# Patient Record
Sex: FEMALE | Race: BLACK OR AFRICAN AMERICAN | NOT HISPANIC OR LATINO | URBAN - METROPOLITAN AREA
[De-identification: names, ages, dates, MRNs, and addresses within clinical notes are randomized per-mention and may not be internally consistent; named-entity substitution may affect disease eponyms.]

---

## 2024-01-11 ENCOUNTER — OFFICE VISIT (OUTPATIENT)
Dept: OPHTHALMOLOGY | Facility: CLINIC | Age: 29
End: 2024-01-11
Payer: COMMERCIAL

## 2024-01-11 DIAGNOSIS — H52.13 MYOPIA OF BOTH EYES WITH ASTIGMATISM: ICD-10-CM

## 2024-01-11 DIAGNOSIS — H53.023 REFRACTIVE AMBLYOPIA OF BOTH EYES: ICD-10-CM

## 2024-01-11 DIAGNOSIS — H52.203 MYOPIA OF BOTH EYES WITH ASTIGMATISM: ICD-10-CM

## 2024-01-11 DIAGNOSIS — H40.003 GLAUCOMA SUSPECT OF BOTH EYES: Primary | ICD-10-CM

## 2024-01-11 PROCEDURE — 92133 CPTRZD OPH DX IMG PST SGM ON: CPT | Performed by: OPTOMETRIST

## 2024-01-11 PROCEDURE — 76514 ECHO EXAM OF EYE THICKNESS: CPT | Performed by: OPTOMETRIST

## 2024-01-11 PROCEDURE — 92015 DETERMINE REFRACTIVE STATE: CPT | Performed by: OPTOMETRIST

## 2024-01-11 PROCEDURE — 92083 EXTENDED VISUAL FIELD XM: CPT | Performed by: OPTOMETRIST

## 2024-01-11 PROCEDURE — 92004 COMPRE OPH EXAM NEW PT 1/>: CPT | Performed by: OPTOMETRIST

## 2024-01-11 ASSESSMENT — PACHYMETRY
OD_CT(UM): 495
OS_CT(UM): 484

## 2024-01-11 ASSESSMENT — REFRACTION_MANIFEST
OD_CYLINDER: -2.50
OD_SPHERE: -2.25
OD_SPHERE: -2.50
OS_AXIS: 180
OS_SPHERE: -2.00
OD_CYLINDER: -2.50
OD_AXIS: 180
OS_CYLINDER: -3.00
OS_AXIS: 179
METHOD_AUTOREFRACTION: 1
OD_AXIS: 180
OS_CYLINDER: -3.00
OS_SPHERE: -2.50

## 2024-01-11 ASSESSMENT — VISUAL ACUITY
OS_PH_SC: 20/30
OS_SC: 20/60
OD_SC: 20/80
OD_PH_SC: 20/30
METHOD: SNELLEN - LINEAR

## 2024-01-11 ASSESSMENT — SLIT LAMP EXAM - LIDS
COMMENTS: GOOD POSITION
COMMENTS: GOOD POSITION

## 2024-01-11 ASSESSMENT — TONOMETRY
IOP_METHOD: GOLDMANN APPLANATION
OD_IOP_MMHG: 18
OS_IOP_MMHG: 18

## 2024-01-11 ASSESSMENT — CUP TO DISC RATIO
OD_RATIO: .6
OS_RATIO: .6

## 2024-01-11 ASSESSMENT — EXTERNAL EXAM - LEFT EYE: OS_EXAM: NORMAL

## 2024-01-11 ASSESSMENT — EXTERNAL EXAM - RIGHT EYE: OD_EXAM: NORMAL

## 2024-01-11 NOTE — PROGRESS NOTES
Glaucoma suspect. No fam hx GLC. Optic nerve cupping and rim follows ISNT rule and   IOP 18 OU, pachy adjust +4 OU.   Optical coherence tomography of the retinal nerve fiber layer (RNFL) revealed:   OD: Normal thickness in all four sectors with an average RNFL thickness of 94 micron.  OS: Normal thickness in all four sectors with an average RNFL thickness of 96 micron.   A Santos 24-2 threshold visual field test was done.  Results were:  OD: the absence of scotoma, pattern standard deviation 2.19 dB, mean deviation -2.88 dB fixation losses  OS: the absence of scotoma, pattern standard deviation 2.21 dB, mean deviation -3.29 dB fixation losses    A spectacle prescription was dispensed to be used as needed.     VA corrects to 20/25 OD and OS with moderately high cylinder and due to refractive amblyopia.   The patient was asked to return to our clinic in one year or sooner if ocular or vision changes occur.